# Patient Record
Sex: FEMALE | Race: WHITE
[De-identification: names, ages, dates, MRNs, and addresses within clinical notes are randomized per-mention and may not be internally consistent; named-entity substitution may affect disease eponyms.]

---

## 2017-01-22 ENCOUNTER — HOSPITAL ENCOUNTER (EMERGENCY)
Dept: HOSPITAL 45 - C.EDB | Age: 22
Discharge: HOME | End: 2017-01-22
Payer: COMMERCIAL

## 2017-01-22 VITALS
HEIGHT: 62.99 IN | HEIGHT: 62.99 IN | WEIGHT: 131.4 LBS | BODY MASS INDEX: 23.28 KG/M2 | WEIGHT: 131.4 LBS | BODY MASS INDEX: 23.28 KG/M2

## 2017-01-22 VITALS — DIASTOLIC BLOOD PRESSURE: 92 MMHG | SYSTOLIC BLOOD PRESSURE: 130 MMHG | HEART RATE: 79 BPM | OXYGEN SATURATION: 98 %

## 2017-01-22 VITALS — TEMPERATURE: 98.42 F

## 2017-01-22 DIAGNOSIS — Z83.3: ICD-10-CM

## 2017-01-22 DIAGNOSIS — Z84.1: ICD-10-CM

## 2017-01-22 DIAGNOSIS — R07.89: Primary | ICD-10-CM

## 2017-01-22 DIAGNOSIS — Z82.49: ICD-10-CM

## 2017-01-22 LAB
ANION GAP SERPL CALC-SCNC: 11 MMOL/L (ref 3–11)
BASOPHILS # BLD: 0.02 K/UL (ref 0–0.2)
BASOPHILS NFR BLD: 0.2 %
BUN SERPL-MCNC: 7 MG/DL (ref 7–18)
BUN/CREAT SERPL: 8.8 (ref 10–20)
CALCIUM SERPL-MCNC: 9.5 MG/DL (ref 8.5–10.1)
CHLORIDE SERPL-SCNC: 102 MMOL/L (ref 98–107)
CO2 SERPL-SCNC: 27 MMOL/L (ref 21–32)
COMPLETE: YES
CREAT CL PREDICTED SERPL C-G-VRATE: 99.8 ML/MIN
CREAT SERPL-MCNC: 0.75 MG/DL (ref 0.6–1.2)
EOSINOPHIL NFR BLD AUTO: 371 K/UL (ref 130–400)
GLUCOSE SERPL-MCNC: 89 MG/DL (ref 70–99)
HCT VFR BLD CALC: 39.3 % (ref 37–47)
IG%: 0.2 %
IMM GRANULOCYTES NFR BLD AUTO: 38.1 %
LYMPHOCYTES # BLD: 3.3 K/UL (ref 1.2–3.4)
MCH RBC QN AUTO: 31.2 PG (ref 25–34)
MCHC RBC AUTO-ENTMCNC: 35.4 G/DL (ref 32–36)
MCV RBC AUTO: 88.3 FL (ref 80–100)
MONOCYTES NFR BLD: 11 %
NEUTROPHILS # BLD AUTO: 0.5 %
NEUTROPHILS NFR BLD AUTO: 50 %
PMV BLD AUTO: 8.9 FL (ref 7.4–10.4)
POTASSIUM SERPL-SCNC: 3.5 MMOL/L (ref 3.5–5.1)
RBC # BLD AUTO: 4.45 M/UL (ref 4.2–5.4)
SODIUM SERPL-SCNC: 140 MMOL/L (ref 136–145)
WBC # BLD AUTO: 8.67 K/UL (ref 4.8–10.8)

## 2017-01-22 NOTE — EMERGENCY ROOM VISIT NOTE
History


Report prepared by Radha:  Sean Landin


Under the Supervision of:  Dr. Mushtaq Payan D.O.


First contact with patient:  18:40


Chief Complaint:  RIB PAIN


Stated Complaint:  PAIN IN RT RIBS & BACK





History of Present Illness


The patient is a 21 year old female who presents to the Emergency Room with 

complaints of persistent right rib pain that worsened after she sneezed 45 

minutes ago. The patient notes that she has had a lingering pain for the past 

four days, but she presented to ED this evening after she sneezed because the 

pain became excruciating and she thought she felt something move or snap. She 

describes the discomfort as stabbing. Holding pressure relieves the pain 

somewhat. Breathing worsens the discomfort. Pt denies headache, change in vision

, fevers, nausea, vomiting, diarrhea, pain with urination, melena, runny nose, 

sore throat, swelling of calves, abdominal pain, and rash. She also denies 

history of blood clots, recent travel, history of cancer, hemoptysis, recent 

surgeries, previous blood clots, falls or trauma.  She denies any hypertension, 

hyperlipidemia, CAD or MI.





   Source of History:  patient


   Onset:  4 days ago


   Position:  other (right ribs)


   Symptom Intensity:  excruciating


   Quality:  sharp, stabbing


   Timing:  other (persistent)


   Modifying Factors (Worsening):  breathing


   Modifying Factors (Relieving):  other (holding pressure)


   Associated Symptoms:  No abdominal pain, No diarrhea, No fevers, No headache

, No melena, No nausea, No rash, No vomiting


Note:


Denies: change in vision, runny nose, sore throat, swelling of calves





Review of Systems


See HPI for pertinent positives & negatives. A total of 10 systems reviewed and 

were otherwise negative.





Past Medical & Surgical


Medical Problems:


(1) Bronchitis


(2) Contusion of rib


(3) Contusion, arm, upper


(4) No significant medical problems


(5) Pneumonia


Surgical Problems:


(1) No significant past surgical history








Family History





Cancer


Diabetes mellitus


FHx: gallbladder disease


Heart disease


Hypertension


Kidney disease


Kidney stones


Seizures





Social History


Smoking Status:  Never Smoker


Alcohol Use:  none


Drug Use:  none


Marital Status:  single


Housing Status:  lives with family


Occupation Status:  employed, student





Current/Historical Medications


Scheduled


Birth Control Pills (Birth Control Pills), 1 TAB PO DAILY


Calcium Carbonate (Tums), 3 TABS PO AS DIRECTED


Ibuprofen (Advil), 400-600 MG PO Q6H





Allergies


Coded Allergies:  


     No Known Allergies (Unverified , 1/22/17)





Physical Exam


Vital Signs











  Date Time  Temp Pulse Resp B/P Pulse Ox O2 Delivery O2 Flow Rate FiO2


 


1/22/17 21:00  79 18 130/92 98   


 


1/22/17 20:01  95 18 124/84 98 Room Air  


 


1/22/17 18:17 36.9 110 20 153/94 97 Room Air  











Physical Exam


GENERAL: sitting up in bed, holding her right chest wall


EYE EXAM: normal conjunctiva,


OROPHARYNX: no exudate, no erythema, lips, buccal mucosa, and tongue normal and 

mucous membranes are moist


NECK: supple, no nuchal rigidity, no adenopathy, non-tender


LUNGS: Clear to auscultation. Normal chest wall mechanics


HEART: no murmurs, S1 normal and S2 normal.. tachycardic


CHEST: acute reproducible tenderness under right breast, no erythema or bruising

, skin intact. 


ABDOMEN: abdomen soft, non-tender, normo-active bowel sounds, no masses, no 

rebound or guarding. 


BACK: Back is symmetrical on inspection and there is no deformity, no midline 

tenderness, no CVA tenderness. 


SKIN: no rashes and no bruising 


UPPER EXTREMITIES: upper extremities are grossly normal. 


LOWER EXTREMITIES: No pitting edema.


NEURO EXAM: Normal sensorium, cranial nerves II-XII grossly intact, normal 

speech,  no gross weakness of arms, no gross weakness of legs. Gross sensation 

intact.





Medical Decision & Procedures


ER Provider


Diagnostic Interpretation:


Xray results per the radiologist and my interpretation. Other results have been 

interpreted by the radiologist and reviewed by me.





RIGHT RIBS UNILATERAL WITH PA CHEST





CLINICAL HISTORY: Right rib pain. Coughing.    





COMPARISON STUDY:  No previous studies for comparison.





FINDINGS: The erect chest reveals no pneumothorax. There is no focal pulmonary


consolidation. Multiple views of the right ribs reveal no acute fractures.





IMPRESSION:  No evidence of pneumothorax. No right-sided rib fractures are


visualized. 














Electronically signed by:  Jose Luong M.D.


1/22/2017 7:34 PM





Dictated Date/Time:  1/22/2017 7:33 PM





Laboratory Results


1/22/17 18:55








Red Blood Count 4.45, Mean Corpuscular Volume 88.3, Mean Corpuscular Hemoglobin 

31.2, Mean Corpuscular Hemoglobin Concent 35.4, Mean Platelet Volume 8.9, 

Neutrophils (%) (Auto) 50.0, Lymphocytes (%) (Auto) 38.1, Monocytes (%) (Auto) 

11.0, Eosinophils (%) (Auto) 0.5, Basophils (%) (Auto) 0.2, Neutrophils # (Auto

) 4.34, Lymphocytes # (Auto) 3.30, Monocytes # (Auto) 0.95, Eosinophils # (Auto

) 0.04, Basophils # (Auto) 0.02





1/22/17 18:55

















Test


  1/22/17


18:55


 


White Blood Count


  8.67 K/uL


(4.8-10.8)


 


Red Blood Count


  4.45 M/uL


(4.2-5.4)


 


Hemoglobin


  13.9 g/dL


(12.0-16.0)


 


Hematocrit 39.3 % (37-47) 


 


Mean Corpuscular Volume


  88.3 fL


()


 


Mean Corpuscular Hemoglobin


  31.2 pg


(25-34)


 


Mean Corpuscular Hemoglobin


Concent 35.4 g/dl


(32-36)


 


Platelet Count


  371 K/uL


(130-400)


 


Mean Platelet Volume


  8.9 fL


(7.4-10.4)


 


Neutrophils (%) (Auto) 50.0 % 


 


Lymphocytes (%) (Auto) 38.1 % 


 


Monocytes (%) (Auto) 11.0 % 


 


Eosinophils (%) (Auto) 0.5 % 


 


Basophils (%) (Auto) 0.2 % 


 


Neutrophils # (Auto)


  4.34 K/uL


(1.4-6.5)


 


Lymphocytes # (Auto)


  3.30 K/uL


(1.2-3.4)


 


Monocytes # (Auto)


  0.95 K/uL


(0.11-0.59)


 


Eosinophils # (Auto)


  0.04 K/uL


(0-0.5)


 


Basophils # (Auto)


  0.02 K/uL


(0-0.2)


 


RDW Standard Deviation


  41.9 fL


(36.4-46.3)


 


RDW Coefficient of Variation


  12.9 %


(11.5-14.5)


 


Immature Granulocyte % (Auto) 0.2 % 


 


Immature Granulocyte # (Auto)


  0.02 K/uL


(0.00-0.02)


 


D-Dimer


  330 ug/L FEU


(0-500)


 


Anion Gap


  11.0 mmol/L


(3-11)


 


Est Creatinine Clear Calc


Drug Dose 99.8 ml/min 


 


 


Estimated GFR (


American) 133.9 


 


 


Estimated GFR (Non-


American 115.6 


 


 


BUN/Creatinine Ratio 8.8 (10-20) 


 


Calcium Level


  9.5 mg/dl


(8.5-10.1)





Laboratory results per my review.





Medications Administered











 Medications


  (Trade)  Dose


 Ordered  Sig/Nely


 Route  Start Time


 Stop Time Status Last Admin


Dose Admin


 


 Sodium Chloride


  (Nss 500ml)  500 ml @ 


 999 mls/hr  Q31M STAT


 IV  1/22/17 18:47


 1/22/17 19:17 DC 1/22/17 19:00


999 MLS/HR


 


 Ketorolac


 Tromethamine


  (Toradol Inj)  30 mg  NOW  STAT


 IV  1/22/17 18:47


 1/22/17 18:49 DC 1/22/17 19:00


30 MG


 


 Morphine Sulfate


  (MoRPHine


 SULFATE INJ)  4 mg  NOW  STAT


 IV  1/22/17 18:47


 1/22/17 18:49 DC 1/22/17 18:59


4 MG











ED Course


ED COURSE: 


Vital signs were reviewed and showed tachycardic.


The patients medical record was reviewed


The above diagnostic studies were performed and reviewed.


ED treatments and interventions as stated above. 





1837: The patient was evaluated in room C9. A complete history and physical 

examination was performed.





1847: Ordered Morphine Sulfate 4 mg IV, Toradol 30 mg IV,  ml @ 999 mls/

hr IV.





1942: Upon reevaluation, the patient is resting.I discussed my findings with 

the patient and she understands and agrees with the treatment plan.   


Based on the patients age, coexisting illnesses, exam and lab findings the 

decision to treat as an outpatient was made.


The patient remained stable while under my care.


The patient appeared well at the time of discharge.





Medical Decision


Differential diagnoses includes but is not limited to acute coronary syndrome, 

myocardial infarction, pericarditis, pulmonary embolus, aortic dissection, 

pneumonia, pneumothorax, musculoskeletal, shingles, esophageal. 





Patient is a 21-year-old female who presents the ER severe right-sided chest 

pain which is clearly reproducible on exam.  She has point tenderness under her 

right breast.  No signs of trauma or erythema.  Patient is given IV Toradol 

morphine.  Chest x-ray shows no rib fractures.  CBC along with BMP was 

unremarkable.  D-dimer was negative.  She is a low risk for PEs.  She is no 

cardiac risk factors.  This is clearly reproducible and consequently I do not 

believe it to be cardiac.  With her negative d-dimer I did not pursue this any 

further.  Pain is improved with splinting and consequently I question whether 

this is a hairline rib fracture/contusion/muscle sprain. Discussed with Pt 

concerning signs and symptoms to watch out for. Pt was instructed to follow up 

with their PCP and discussed with the patient their option to return to the ED 

at anytime for persistent or worsening symptoms. The appropriate anticipatory 

guidance and out-patient management, including indications for return to the 

emergency department, were explained at length to the patient and understood.





Impression





 Primary Impression:  


 Right-sided chest wall pain





Scribe Attestation


The scribe's documentation has been prepared under my direction and personally 

reviewed by me in its entirety. I confirm that the note above accurately 

reflects all work, treatment, procedures, and medical decision making performed 

by me.





Departure Information


Dispostion


Home / Self-Care





Referrals


No Doctor, Assigned (PCP)





Forms


HOME CARE DOCUMENTATION FORM,                                                 

               IMPORTANT VISIT INFORMATION, WORK / SCHOOL INSTRUCTIONS





Patient Instructions


My [facility]





Additional Instructions





Please follow up with your primary care doctor with in the next 24 hours.  Any 

worsening of your symptoms, please return to the ED immediately.  This includes 

any fevers greater than 100.4, worsening pain, passing out, feel blood, or any 

other concerning signs or symptoms from your standpoint.





Please take Tylenol or Motrin as needed for pain.

## 2017-01-22 NOTE — DIAGNOSTIC IMAGING REPORT
RIGHT RIBS UNILATERAL WITH PA CHEST



CLINICAL HISTORY: Right rib pain. Coughing.    



COMPARISON STUDY:  No previous studies for comparison.



FINDINGS: The erect chest reveals no pneumothorax. There is no focal pulmonary

consolidation. Multiple views of the right ribs reveal no acute fractures.



IMPRESSION:  No evidence of pneumothorax. No right-sided rib fractures are

visualized. 









Electronically signed by:  Jose Luong M.D.

1/22/2017 7:34 PM



Dictated Date/Time:  1/22/2017 7:33 PM

## 2017-11-26 ENCOUNTER — HOSPITAL ENCOUNTER (EMERGENCY)
Dept: HOSPITAL 45 - C.EDB | Age: 22
Discharge: HOME | End: 2017-11-26
Payer: COMMERCIAL

## 2017-11-26 VITALS
HEIGHT: 62.01 IN | BODY MASS INDEX: 24.96 KG/M2 | WEIGHT: 137.35 LBS | BODY MASS INDEX: 24.96 KG/M2 | WEIGHT: 137.35 LBS | HEIGHT: 62.01 IN

## 2017-11-26 VITALS — HEART RATE: 78 BPM | DIASTOLIC BLOOD PRESSURE: 78 MMHG | OXYGEN SATURATION: 98 % | SYSTOLIC BLOOD PRESSURE: 111 MMHG

## 2017-11-26 VITALS — TEMPERATURE: 98.42 F

## 2017-11-26 DIAGNOSIS — Z33.1: Primary | ICD-10-CM

## 2017-11-26 DIAGNOSIS — Z84.1: ICD-10-CM

## 2017-11-26 DIAGNOSIS — R10.2: ICD-10-CM

## 2017-11-26 DIAGNOSIS — N83.201: ICD-10-CM

## 2017-11-26 DIAGNOSIS — Z82.0: ICD-10-CM

## 2017-11-26 DIAGNOSIS — Z82.49: ICD-10-CM

## 2017-11-26 DIAGNOSIS — Z83.3: ICD-10-CM

## 2017-11-26 LAB
ALP SERPL-CCNC: 76 U/L (ref 45–117)
ALT SERPL-CCNC: 25 U/L (ref 12–78)
ANION GAP SERPL CALC-SCNC: 10 MMOL/L (ref 3–11)
APPEARANCE UR: CLEAR
AST SERPL-CCNC: 17 U/L (ref 15–37)
BASOPHILS # BLD: 0.03 K/UL (ref 0–0.2)
BASOPHILS NFR BLD: 0.2 %
BILIRUB UR-MCNC: (no result) MG/DL
BUN SERPL-MCNC: 8 MG/DL (ref 7–18)
BUN/CREAT SERPL: 11.3 (ref 10–20)
CALCIUM SERPL-MCNC: 9.2 MG/DL (ref 8.5–10.1)
CHLORIDE SERPL-SCNC: 103 MMOL/L (ref 98–107)
CO2 SERPL-SCNC: 25 MMOL/L (ref 21–32)
COLOR UR: YELLOW
COMPLETE: YES
CREAT CL PREDICTED SERPL C-G-VRATE: 109.7 ML/MIN
CREAT SERPL-MCNC: 0.71 MG/DL (ref 0.6–1.2)
EOSINOPHIL NFR BLD AUTO: 362 K/UL (ref 130–400)
GLUCOSE SERPL-MCNC: 95 MG/DL (ref 70–99)
HCT VFR BLD CALC: 41.5 % (ref 37–47)
IG%: 0.3 %
IMM GRANULOCYTES NFR BLD AUTO: 23.3 %
LYMPHOCYTES # BLD: 3.03 K/UL (ref 1.2–3.4)
MANUAL MICROSCOPIC REQUIRED?: NO
MCH RBC QN AUTO: 30.4 PG (ref 25–34)
MCHC RBC AUTO-ENTMCNC: 34.2 G/DL (ref 32–36)
MCV RBC AUTO: 88.9 FL (ref 80–100)
MONOCYTES NFR BLD: 8.1 %
NEUTROPHILS # BLD AUTO: 0.5 %
NEUTROPHILS NFR BLD AUTO: 67.6 %
NITRITE UR QL STRIP: (no result)
PH UR STRIP: 6.5 [PH] (ref 4.5–7.5)
PMV BLD AUTO: 9.1 FL (ref 7.4–10.4)
POTASSIUM SERPL-SCNC: 3.2 MMOL/L (ref 3.5–5.1)
RBC # BLD AUTO: 4.67 M/UL (ref 4.2–5.4)
REVIEW REQ?: NO
SODIUM SERPL-SCNC: 138 MMOL/L (ref 136–145)
SP GR UR STRIP: 1.01 (ref 1–1.03)
URINE BILL WITH OR WITHOUT MIC: (no result)
UROBILINOGEN UR-MCNC: (no result) MG/DL
WBC # BLD AUTO: 13.03 K/UL (ref 4.8–10.8)
ZZUR CULT IF INDIC CLEAN CATCH: NO

## 2017-11-26 NOTE — EMERGENCY ROOM VISIT NOTE
History


Report prepared by Radha:  Elvira Jones


Under the Supervision of:  Dr. Garret Lucero M.D.


First contact with patient:  17:34


Chief Complaint:  PELVIC  PAIN


Stated Complaint:  PELVIC PAIN, BACK/NECK PAIN





History of Present Illness


The patient is a 22 year old female who presents to the Emergency Room with 

complaints of intermittent pelvic pain beginning this morning. The patient 

notes lower back pain, neck pain, and hot and cold flashes. The patient states 

that she took a pregnancy test 2 days ago. The patient notes some clear vaginal 

discharge which is not normal for her. She denies any fever, or urinary 

burning. The patient states she is a little sick right now with a cough and 

congestion. She states that if she did not have a positive pregnancy test she 

probably wouldn't of come into the ED. The patient is not on birth control. The 

patient's last period was on October 25th and she states she is six days late 

for her period. The patient has a history of ovarian cysts.





   Source of History:  patient


   Onset:  this morning


   Position:  pelvis


   Timing:  intermittent


   Associated Symptoms:  + cough, + neck pain, + back pain, No fevers, No 

urinary symptoms


Note:


The patient notes clear vaginal discharge.





Review of Systems


See HPI for pertinent positives and negatives.  A total of ten systems were 

reviewed and were otherwise negative.





Past Medical & Surgical


Medical Problems:


(1) Bronchitis


(2) Contusion of rib


(3) Contusion, arm, upper


(4) No significant medical problems


(5) Pneumonia


Surgical Problems:


(1) No significant past surgical history








Family History





Cancer


Diabetes mellitus


FHx: gallbladder disease


Heart disease


Hypertension


Kidney disease


Kidney stones


Seizures





Social History


Smoking Status:  Never Smoker


Alcohol Use:  none


Drug Use:  none


Marital Status:  single


Housing Status:  lives with family


Occupation Status:  employed, student





Current/Historical Medications


No Active Prescriptions or Reported Meds





Allergies


Coded Allergies:  


     No Known Allergies (Unverified , 1/22/17)





Physical Exam


Vital Signs











  Date Time  Temp Pulse Resp B/P (MAP) Pulse Ox O2 Delivery O2 Flow Rate FiO2


 


11/26/17 21:53  78 20 111/78 98   


 


11/26/17 20:12  101 18 113/63 98 Room Air  


 


11/26/17 17:28 36.9 108 20 122/86 97 Room Air  











Physical Exam


GENERAL: Awake, alert, well-appearing, in no distress


HENT: Normocephalic, atraumatic. Oropharynx unremarkable.


EYES: Normal conjunctiva. Sclera non-icteric.


NECK: Supple. No nuchal rigidity. FROM. No JVD.


RESPIRATORY: Clear to auscultation.


CARDIAC: Regular rate, normal rhythm. Extremities warm and well perfused. 

Pulses equal.


ABDOMEN: Mild left lower quadrant tenderness, no peritoneal signs. Soft, non-

distended. No rebound or guarding. No masses.


RECTAL: Deferred.


MUSCULOSKELETAL: Chest examination reveals no tenderness. The back is 

symmetrical on inspection without obvious abnormality. There is no CVA 

tenderness to palpation. No joint edema. 


LOWER EXTREMITIES: Calves are equal size bilaterally and non-tender. No edema. 

No discoloration. 


NEURO: Normal sensorium. No sensory or motor deficits noted. 


SKIN: No rash or jaundice noted.





Medical Decision & Procedures


ER Provider


Diagnostic Interpretation:


Radiology results as stated below per my review and radiologist interpretation: 





FETAL ultrasound <14 WKS SINGLE


COMPARISON STUDY:  Abdomen and pelvis CT 9/12/2016. Pelvic ultrasound 9/11/2016.





FINDINGS: Transabdominal and transvaginal scanning of the pelvis was performed.


The uterus measures 8.6 x 5.8 x 4.5 cm. The endometrium slightly heterogeneous


and measures up to 1.2 cm in thickness. Within the center of the endometrial


fundus there is a 9 x 3 mm cystic focus. This favors a small gestational sac.


There may be a tiny yolk sac which measures 2 mm. No fetal pole identified at


this time. These findings favor a 5 week intrauterine gestation. No significant


subchorionic hematoma. Trace pelvic fluid. Thick-walled complex cyst within the


right ovary which measures 2.5 cm. This favors a corpus luteum. Normal left


ovary. Normal color flow within the bilateral ovaries.





IMPRESSION:  


1. A 9 x 3 mm cystic focus within the endometrium which favors a small


intrauterine gestational sac. There may be a tiny yolk sac which measures 2 mm.


No fetal pole identified at this time. This favors a 5 week intrauterine


gestation. Follow-up beta-hCG and pelvic ultrasound in 1 week is recommended to


ensure viability.


2. Complex 2.5 cm right ovarian cyst. This favors a corpus luteum.


3. Normal left ovary.








Electronically signed by:  Shahzad Spencer M.D.





Laboratory Results


11/26/17 17:48








Red Blood Count 4.67, Mean Corpuscular Volume 88.9, Mean Corpuscular Hemoglobin 

30.4, Mean Corpuscular Hemoglobin Concent 34.2, Mean Platelet Volume 9.1, 

Neutrophils (%) (Auto) 67.6, Lymphocytes (%) (Auto) 23.3, Monocytes (%) (Auto) 

8.1, Eosinophils (%) (Auto) 0.5, Basophils (%) (Auto) 0.2, Neutrophils # (Auto) 

8.81, Lymphocytes # (Auto) 3.03, Monocytes # (Auto) 1.06, Eosinophils # (Auto) 

0.06, Basophils # (Auto) 0.03





11/26/17 17:48

















Test


  11/26/17


17:48 11/26/17


17:50


 


White Blood Count


  13.03 K/uL


(4.8-10.8) 


 


 


Red Blood Count


  4.67 M/uL


(4.2-5.4) 


 


 


Hemoglobin


  14.2 g/dL


(12.0-16.0) 


 


 


Hematocrit 41.5 % (37-47)  


 


Mean Corpuscular Volume


  88.9 fL


() 


 


 


Mean Corpuscular Hemoglobin


  30.4 pg


(25-34) 


 


 


Mean Corpuscular Hemoglobin


Concent 34.2 g/dl


(32-36) 


 


 


Platelet Count


  362 K/uL


(130-400) 


 


 


Mean Platelet Volume


  9.1 fL


(7.4-10.4) 


 


 


Neutrophils (%) (Auto) 67.6 %  


 


Lymphocytes (%) (Auto) 23.3 %  


 


Monocytes (%) (Auto) 8.1 %  


 


Eosinophils (%) (Auto) 0.5 %  


 


Basophils (%) (Auto) 0.2 %  


 


Neutrophils # (Auto)


  8.81 K/uL


(1.4-6.5) 


 


 


Lymphocytes # (Auto)


  3.03 K/uL


(1.2-3.4) 


 


 


Monocytes # (Auto)


  1.06 K/uL


(0.11-0.59) 


 


 


Eosinophils # (Auto)


  0.06 K/uL


(0-0.5) 


 


 


Basophils # (Auto)


  0.03 K/uL


(0-0.2) 


 


 


RDW Standard Deviation


  41.7 fL


(36.4-46.3) 


 


 


RDW Coefficient of Variation


  12.9 %


(11.5-14.5) 


 


 


Immature Granulocyte % (Auto) 0.3 %  


 


Immature Granulocyte # (Auto)


  0.04 K/uL


(0.00-0.02) 


 


 


Anion Gap


  10.0 mmol/L


(3-11) 


 


 


Est Creatinine Clear Calc


Drug Dose 109.7 ml/min 


  


 


 


Estimated GFR (


American) 142.1 


  


 


 


Estimated GFR (Non-


American 122.6 


  


 


 


BUN/Creatinine Ratio 11.3 (10-20)  


 


Calcium Level


  9.2 mg/dl


(8.5-10.1) 


 


 


Total Bilirubin


  0.4 mg/dl


(0.2-1) 


 


 


Direct Bilirubin


  < 0.1 mg/dl


(0-0.2) 


 


 


Aspartate Amino Transf


(AST/SGOT) 17 U/L (15-37) 


  


 


 


Alanine Aminotransferase


(ALT/SGPT) 25 U/L (12-78) 


  


 


 


Alkaline Phosphatase


  76 U/L


() 


 


 


Total Protein


  7.8 gm/dl


(6.4-8.2) 


 


 


Albumin


  3.6 gm/dl


(3.4-5.0) 


 


 


Lipase


  195 U/L


() 


 


 


Human Chorionic Gonadotropin,


Quant 3555 mIU/mL 


  


 


 


Urine Color  YELLOW 


 


Urine Appearance  CLEAR (CLEAR) 


 


Urine pH  6.5 (4.5-7.5) 


 


Urine Specific Gravity


  


  1.010


(1.000-1.030)


 


Urine Protein  NEG (NEG) 


 


Urine Glucose (UA)  NEG (NEG) 


 


Urine Ketones  NEG (NEG) 


 


Urine Occult Blood  NEG (NEG) 


 


Urine Nitrite  NEG (NEG) 


 


Urine Bilirubin  NEG (NEG) 


 


Urine Urobilinogen  NEG (NEG) 


 


Urine Leukocyte Esterase  NEG (NEG) 





Laboratory results reviewed by me





Medications Administered











 Medications


  (Trade)  Dose


 Ordered  Sig/Nely


 Route  Start Time


 Stop Time Status Last Admin


Dose Admin


 


 Lactated Ringer's  1,000 ml @ 


 999 mls/hr  Q1H1M STAT


 IV  11/26/17 17:44


 11/26/17 18:44 DC 11/26/17 18:27


999 MLS/HR


 


 Acetaminophen


  (Tylenol Tab)  650 mg  NOW  STAT


 PO  11/26/17 17:44


 11/26/17 17:45 DC 11/26/17 18:27


650 MG











ED Course


1735: The patient was evaluated in room B11B. A complete history and physical 

exam was performed.





2208: I updated the patient on her test results she will follow up with OBGYN.





2236: I reevaluated the patient. Discussed results and discharge instructions: 

She verbalized understanding and agreement. The patient is ready for discharge.





Medical Decision


I reviewed the patient's past medical history, medications, and the nursing 

notes as described above. Differential Diagnoses include: Pregnancy, ectopic 

pregnancy, ovarian torsion, UTI, pyelonephritis, ureteral stone, hemorrhagic 

cyst.





The patient is a 22-year-old woman who presents to emergency Department with 

lower abdominal pain and back aches for the past several days in the setting of 

a positive home pregnancy test after being 6 days late for her menstrual cycle 

per history of present illness.  The patient is well-appearing, afebrile with 

stable vital signs.  Mild left lower quadrant discomfort but otherwise no 

peritoneal signs. WBC 13 nonspecific, hcg 3500. Labs otherwise unremarkable 

including negative UA. TVUS demonstrates likely early IUP. Patient prefers to 

defer pelvic exam for when she follows up with her OB/GYN. Findings and plan 

for follow-up reviewed with patient. Patient agreeable and d/c'd per discharge 

instructions.





Blood Pressure Screening


Patient's blood pressure:  Normal blood pressure





Impression





 Primary Impression:  


 Intrauterine pregnancy





Scribe Attestation


The scribe's documentation has been prepared under my direction and personally 

reviewed by me in its entirety. I confirm that the note above accurately 

reflects all work, treatment, procedures, and medical decision making performed 

by me.





Departure Information


Dispostion


Home / Self-Care





Prescriptions





No Active Prescriptions or Reported Meds





Referrals


No Doctor, Assigned (PCP)





Patient Instructions


ED Preg Established Normal Sxs, My [facility]





Additional Instructions





Please follow up with your OB/GYN within the next week for re-evaluation.





You were found to have a likely intrauterine pregnancy.


Otherwise, your exam, ultrasound, and lab results did not show signs of an 

emergent condition at this time.





Return to the emergency department for worsening symptoms as described in the 

accompanying instructions.








----------------------





FETAL ultrasound <14 WKS SINGLE





CLINICAL HISTORY: +home preg test, LLQ abd pain   





COMPARISON STUDY:  Abdomen and pelvis CT 9/12/2016. Pelvic ultrasound 9/11/2016.





FINDINGS: Transabdominal and transvaginal scanning of the pelvis was performed.


The uterus measures 8.6 x 5.8 x 4.5 cm. The endometrium slightly heterogeneous


and measures up to 1.2 cm in thickness. Within the center of the endometrial


fundus there is a 9 x 3 mm cystic focus. This favors a small gestational sac.


There may be a tiny yolk sac which measures 2 mm. No fetal pole identified at


this time. These findings favor a 5 week intrauterine gestation. No significant


subchorionic hematoma. Trace pelvic fluid. Thick-walled complex cyst within the


right ovary which measures 2.5 cm. This favors a corpus luteum. Normal left


ovary. Normal color flow within the bilateral ovaries.





IMPRESSION:  


1. A 9 x 3 mm cystic focus within the endometrium which favors a small


intrauterine gestational sac. There may be a tiny yolk sac which measures 2 mm.


No fetal pole identified at this time. This favors a 5 week intrauterine


gestation. Follow-up beta-hCG and pelvic ultrasound in 1 week is recommended to


ensure viability.


2. Complex 2.5 cm right ovarian cyst. This favors a corpus luteum.


3. Normal left ovary.

## 2017-12-22 ENCOUNTER — HOSPITAL ENCOUNTER (OUTPATIENT)
Dept: HOSPITAL 45 - C.LAB1850 | Age: 22
Discharge: HOME | End: 2017-12-22
Attending: OBSTETRICS & GYNECOLOGY
Payer: COMMERCIAL

## 2017-12-22 DIAGNOSIS — Z3A.00: ICD-10-CM

## 2017-12-22 DIAGNOSIS — Z34.01: Primary | ICD-10-CM

## 2017-12-22 LAB
APPEARANCE UR: (no result)
BASOPHILS # BLD: 0.02 K/UL (ref 0–0.2)
BASOPHILS NFR BLD: 0.2 %
BILIRUB UR-MCNC: (no result) MG/DL
COLOR UR: YELLOW
COMPLETE: YES
EOSINOPHIL NFR BLD AUTO: 349 K/UL (ref 130–400)
HCT VFR BLD CALC: 37.4 % (ref 37–47)
IG%: 0.3 %
IMM GRANULOCYTES NFR BLD AUTO: 22.7 %
LYMPHOCYTES # BLD: 2.79 K/UL (ref 1.2–3.4)
MANUAL MICROSCOPIC REQUIRED?: NO
MCH RBC QN AUTO: 30.6 PG (ref 25–34)
MCHC RBC AUTO-ENTMCNC: 34.5 G/DL (ref 32–36)
MCV RBC AUTO: 88.8 FL (ref 80–100)
MONOCYTES NFR BLD: 7.6 %
NEUTROPHILS # BLD AUTO: 0.3 %
NEUTROPHILS NFR BLD AUTO: 68.9 %
NITRITE UR QL STRIP: (no result)
PH UR STRIP: 8.5 [PH] (ref 4.5–7.5)
PMV BLD AUTO: 9.4 FL (ref 7.4–10.4)
RBC # BLD AUTO: 4.21 M/UL (ref 4.2–5.4)
REVIEW REQ?: NO
SP GR UR STRIP: 1.02 (ref 1–1.03)
URINE BILL WITH OR WITHOUT MIC: (no result)
URINE EPITHELIAL CELL AUTO: (no result) /LPF (ref 0–5)
UROBILINOGEN UR-MCNC: (no result) MG/DL
WBC # BLD AUTO: 12.31 K/UL (ref 4.8–10.8)

## 2017-12-26 LAB
CHLAMYDIA TRACH RNA***: NOT DETECTED
GC (NEIS GONORRHOEAE)RNA**: NOT DETECTED

## 2018-02-12 ENCOUNTER — HOSPITAL ENCOUNTER (OUTPATIENT)
Dept: HOSPITAL 45 - C.LAB1850 | Age: 23
Discharge: HOME | End: 2018-02-12
Attending: OBSTETRICS & GYNECOLOGY
Payer: COMMERCIAL

## 2018-02-12 DIAGNOSIS — Z34.02: Primary | ICD-10-CM

## 2018-03-23 ENCOUNTER — HOSPITAL ENCOUNTER (EMERGENCY)
Dept: HOSPITAL 45 - C.EDB | Age: 23
Discharge: HOME | End: 2018-03-23
Payer: COMMERCIAL

## 2018-03-23 VITALS
HEIGHT: 62.01 IN | WEIGHT: 154.32 LBS | BODY MASS INDEX: 28.04 KG/M2 | BODY MASS INDEX: 28.04 KG/M2 | HEIGHT: 62.01 IN | WEIGHT: 154.32 LBS

## 2018-03-23 VITALS — DIASTOLIC BLOOD PRESSURE: 78 MMHG | HEART RATE: 82 BPM | OXYGEN SATURATION: 100 % | SYSTOLIC BLOOD PRESSURE: 121 MMHG

## 2018-03-23 VITALS — TEMPERATURE: 98.06 F

## 2018-03-23 DIAGNOSIS — R79.1: ICD-10-CM

## 2018-03-23 DIAGNOSIS — E86.0: ICD-10-CM

## 2018-03-23 DIAGNOSIS — H53.8: ICD-10-CM

## 2018-03-23 DIAGNOSIS — R53.83: ICD-10-CM

## 2018-03-23 DIAGNOSIS — R55: Primary | ICD-10-CM

## 2018-03-23 LAB
ALBUMIN SERPL-MCNC: 2.7 GM/DL (ref 3.4–5)
ALP SERPL-CCNC: 76 U/L (ref 45–117)
ALT SERPL-CCNC: 16 U/L (ref 12–78)
AST SERPL-CCNC: 10 U/L (ref 15–37)
BASOPHILS # BLD: 0.01 K/UL (ref 0–0.2)
BASOPHILS NFR BLD: 0.1 %
BUN SERPL-MCNC: 7 MG/DL (ref 7–18)
CALCIUM SERPL-MCNC: 8.8 MG/DL (ref 8.5–10.1)
CO2 SERPL-SCNC: 23 MMOL/L (ref 21–32)
CREAT SERPL-MCNC: 0.58 MG/DL (ref 0.6–1.2)
EOS ABS #: 0.04 K/UL (ref 0–0.5)
EOSINOPHIL NFR BLD AUTO: 300 K/UL (ref 130–400)
GLUCOSE SERPL-MCNC: 87 MG/DL (ref 70–99)
HCT VFR BLD CALC: 34.4 % (ref 37–47)
HGB BLD-MCNC: 12 G/DL (ref 12–16)
IG#: 0.08 K/UL (ref 0–0.02)
IMM GRANULOCYTES NFR BLD AUTO: 15.8 %
LYMPHOCYTES # BLD: 2.22 K/UL (ref 1.2–3.4)
MCH RBC QN AUTO: 31.5 PG (ref 25–34)
MCHC RBC AUTO-ENTMCNC: 34.9 G/DL (ref 32–36)
MCV RBC AUTO: 90.3 FL (ref 80–100)
MONO ABS #: 1.39 K/UL (ref 0.11–0.59)
MONOCYTES NFR BLD: 9.9 %
NEUT ABS #: 10.3 K/UL (ref 1.4–6.5)
NEUTROPHILS # BLD AUTO: 0.3 %
NEUTROPHILS NFR BLD AUTO: 73.3 %
PMV BLD AUTO: 9.1 FL (ref 7.4–10.4)
POTASSIUM SERPL-SCNC: 3.2 MMOL/L (ref 3.5–5.1)
PROT SERPL-MCNC: 7.2 GM/DL (ref 6.4–8.2)
RED CELL DISTRIBUTION WIDTH CV: 14.4 % (ref 11.5–14.5)
RED CELL DISTRIBUTION WIDTH SD: 47.3 FL (ref 36.4–46.3)
SODIUM SERPL-SCNC: 136 MMOL/L (ref 136–145)
WBC # BLD AUTO: 14.04 K/UL (ref 4.8–10.8)

## 2018-03-23 NOTE — DIAGNOSTIC IMAGING REPORT
(CHEST FOR PE) ANGIO WITH



CLINICAL HISTORY: 22 years-old Female presenting with 

^CHEST PAIN--IF ABLE PLEASE EVALUATE THORACIC AORTA AS WELL. 



TECHNIQUE: Multidetector CT angiography of the chest was performed after

administration of intravenous contrast. 3-D volumetric and/or maximum intensity

projection (MIP) images were subsequently reconstructed for review. IV contrast:

Optiray 320. A dose lowering technique was used consistent with the principles

of ALARA (as low as reasonably achievable).



COMPARISON: Chest x-ray from 2015.



CT DOSE (mGy.cm): The estimated cumulative dose is 519.83 mGy.cm.



FINDINGS:



 topogram: Unremarkable.



Pulmonary vasculature:



The study is suboptimal for the assessment of the pulmonary vascular tree

secondary to timing of the contrast bolus and respiratory motion artifact.

Allowing for limited image quality, no central filling defect to suggest

pulmonary embolus. Main pulmonary artery is not enlarged. No flattening of the

interventricular septum. No intracardiac filling defect. No reflux of contrast

into the hepatic veins.



Remaining chest:



On soft tissue windows, normal thyroid and thoracic inlet. No axillary,

supraclavicular, hilar, or mediastinal lymphadenopathy. Normal aorta. Normal

heart size. No pericardial or pleural effusion. Upper abdomen normal.



On lung windows, minimal dependent changes likely atelectasis. No other focal

nodule or infiltrate. Airways patent.



On bone windows, normal osseous structures.



IMPRESSION:

1.  Allowing for suboptimal image quality, no evidence of pulmonary embolus. No

acute intrathoracic pathology.







Electronically signed by:  Bob Chacon M.D.

3/23/2018 7:40 PM



Dictated Date/Time:  3/23/2018 7:36 PM

## 2018-03-23 NOTE — EMERGENCY ROOM VISIT NOTE
History


Report prepared by Radha:  Vaibhav Woodward


Under the Supervision of:  Dr. James Son M.D.


First contact with patient:  17:25


Chief Complaint:  DIZZY


Stated Complaint:  DIZZINESS,SOB,BLURRED VISION,TINGLING IN HANDS


Nursing Triage Summary:  


pt to the ED with c/o feeling dizzy and near syncope today and like her heart 

is 


fluttering 





pt is 22 wks preg





no vag bleeding 





+ fetal movements





History of Present Illness


The patient is a 22 year old female who presents to the Emergency Room with 

complaints of dizziness and near syncope that began prior to arrival. The 

patient was in class when she felt these symptoms and noted that her vision 

became blurry. In the last week, she states that she has had intermittent right 

lower abdominal cramping pain and SOB for the past 1 week that worsens with 

movement. She reports vaginal discharge, urinary burning, and back pain. She 

reports nausea, vomiting (1 episode today), headache, and sick contacts at 

home. She states that she is 22 weeks pregnant and her recent ultrasound was 2 

weeks ago and showed flickering fetal movements. She denies ankle swelling, 

fevers, vaginal bleeding, and a history of blood clots. Of note, Dr. Brunner is 

her doctor who she follows up regularly.





   Source of History:  patient


   Onset:  PTA


   Position:  other (global)


   Symptom Intensity:  pain rated as 0/10


   Quality:  other (dizziness and near syncope)


   Timing:  intermittent


   Modifying Factors (Worsening):  movement


   Associated Symptoms:  + headache, + SOB (1 week), + nausea, + vomiting (1 

episode ), + abdominal pain (right lower quadrant cramping), + back pain, + 

urinary symptoms (urinary burning), No fevers


Note:


Patient reports sick contacts, and vaginal discharge. Patient denies ankle 

swelling and vaginal bleeding.





Review of Systems


See HPI for pertinent positives & negatives. A total of 10 systems reviewed and 

were otherwise negative.





Past Medical & Surgical


Medical Problems:


(1) Bronchitis


(2) Contusion of rib


(3) Contusion, arm, upper


(4) No significant medical problems


(5) Pneumonia


Surgical Problems:


(1) No significant past surgical history








Family History





Cancer


Diabetes mellitus


FHx: gallbladder disease


Heart disease


Hypertension


Kidney disease


Kidney stones


Seizures





Social History


Smoking Status:  Never Smoker


Alcohol Use:  none


Drug Use:  none


Marital Status:  single


Housing Status:  lives with family


Occupation Status:  employed, student





Current/Historical Medications


Scheduled


Oseltamivir (Tamiflu), 75 MG PO BID





Allergies


Coded Allergies:  


     No Known Allergies (Unverified , 1/22/17)





Physical Exam


Vital Signs











  Date Time  Temp Pulse Resp B/P (MAP) Pulse Ox O2 Delivery O2 Flow Rate FiO2


 


3/23/18 20:02  82 20 121/78 100   


 


3/23/18 19:01  83  124/75 100 Room Air  


 


3/23/18 18:50  72 20 140/85 98 Room Air  


 


3/23/18 17:36  98      


 


3/23/18 17:26  120  153/96    





  88  130/89    





  128  133/102    


 


3/23/18 16:49 36.7 110 18 145/95 98   











Physical Exam


GENERAL: Patient is anxious appearing and in mild distress.


EYES: No scleral icterus, unremarkable pupils.


ENT: Mildly dry mucous membranes, no nasal congestion.


NECK: No masses appreciated, no meningismus, trachea is midline.


RESPIRATORY: No dyspnea. Clear to auscultation and equal bilaterally. No wheeze

, no rhonchi.


CARDIOVASCULAR: Mildly tachycardiac. Regular rhythm.  No murmurs, rubs, gallops 

appreciated.


GASTROINTESTINAL: Large abdominal fundus consistent with pregnancy dates. 

Abdomen soft, nontender, no peritonitis.  Bowel sounds positive.  No masses 

appreciated.


BACK: No midline tenderness, no CVA tenderness


EXTREMITIES: Normal motion all extremities, no cyanosis, no edema.


NEUROLOGIC: Alert and oriented, no acute motor or sensory deficits, no focal 

weakness, cranial nerves grossly intact.


SKIN: No rash, no jaundice, no diaphoresis.





Medical Decision & Procedures


ER Provider


Diagnostic Interpretation:


Radiology results and stated below per my review and radiologist interpretation:





(CHEST FOR PE) ANGIO WITH





CLINICAL HISTORY: 22 years-old Female presenting with 


^CHEST PAIN--IF ABLE PLEASE EVALUATE THORACIC AORTA AS WELL. 





TECHNIQUE: Multidetector CT angiography of the chest was performed after


administration of intravenous contrast. 3-D volumetric and/or maximum intensity


projection (MIP) images were subsequently reconstructed for review. IV contrast:


Optiray 320. A dose lowering technique was used consistent with the principles


of ALARA (as low as reasonably achievable).





COMPARISON: Chest x-ray from 2015.





CT DOSE (mGy.cm): The estimated cumulative dose is 519.83 mGy.cm.





FINDINGS:





 topogram: Unremarkable.





Pulmonary vasculature:





The study is suboptimal for the assessment of the pulmonary vascular tree


secondary to timing of the contrast bolus and respiratory motion artifact.


Allowing for limited image quality, no central filling defect to suggest


pulmonary embolus. Main pulmonary artery is not enlarged. No flattening of the


interventricular septum. No intracardiac filling defect. No reflux of contrast


into the hepatic veins.





Remaining chest:





On soft tissue windows, normal thyroid and thoracic inlet. No axillary,


supraclavicular, hilar, or mediastinal lymphadenopathy. Normal aorta. Normal


heart size. No pericardial or pleural effusion. Upper abdomen normal.





On lung windows, minimal dependent changes likely atelectasis. No other focal


nodule or infiltrate. Airways patent.





On bone windows, normal osseous structures.





IMPRESSION:


1.  Allowing for suboptimal image quality, no evidence of pulmonary embolus. No


acute intrathoracic pathology.











Electronically signed by:  Bob Chacon M.D.


3/23/2018 7:40 PM





Dictated Date/Time:  3/23/2018 7:36 PM





Laboratory Results


3/23/18 17:45








Red Blood Count 3.81, Mean Corpuscular Volume 90.3, Mean Corpuscular Hemoglobin 

31.5, Mean Corpuscular Hemoglobin Concent 34.9, Mean Platelet Volume 9.1, 

Neutrophils (%) (Auto) 73.3, Lymphocytes (%) (Auto) 15.8, Monocytes (%) (Auto) 

9.9, Eosinophils (%) (Auto) 0.3, Basophils (%) (Auto) 0.1, Neutrophils # (Auto) 

10.30, Lymphocytes # (Auto) 2.22, Monocytes # (Auto) 1.39, Eosinophils # (Auto) 

0.04, Basophils # (Auto) 0.01





3/23/18 17:45

















Test


  3/23/18


17:45 3/23/18


18:35


 


White Blood Count


  14.04 K/uL


(4.8-10.8) 


 


 


Red Blood Count


  3.81 M/uL


(4.2-5.4) 


 


 


Hemoglobin


  12.0 g/dL


(12.0-16.0) 


 


 


Hematocrit 34.4 % (37-47)  


 


Mean Corpuscular Volume


  90.3 fL


() 


 


 


Mean Corpuscular Hemoglobin


  31.5 pg


(25-34) 


 


 


Mean Corpuscular Hemoglobin


Concent 34.9 g/dl


(32-36) 


 


 


Platelet Count


  300 K/uL


(130-400) 


 


 


Mean Platelet Volume


  9.1 fL


(7.4-10.4) 


 


 


Neutrophils (%) (Auto) 73.3 %  


 


Lymphocytes (%) (Auto) 15.8 %  


 


Monocytes (%) (Auto) 9.9 %  


 


Eosinophils (%) (Auto) 0.3 %  


 


Basophils (%) (Auto) 0.1 %  


 


Neutrophils # (Auto)


  10.30 K/uL


(1.4-6.5) 


 


 


Lymphocytes # (Auto)


  2.22 K/uL


(1.2-3.4) 


 


 


Monocytes # (Auto)


  1.39 K/uL


(0.11-0.59) 


 


 


Eosinophils # (Auto)


  0.04 K/uL


(0-0.5) 


 


 


Basophils # (Auto)


  0.01 K/uL


(0-0.2) 


 


 


RDW Standard Deviation


  47.3 fL


(36.4-46.3) 


 


 


RDW Coefficient of Variation


  14.4 %


(11.5-14.5) 


 


 


Immature Granulocyte % (Auto) 0.6 %  


 


Immature Granulocyte # (Auto)


  0.08 K/uL


(0.00-0.02) 


 


 


D-Dimer


  1150 ug/L FEU


(0-500) 


 


 


Anion Gap


  9.0 mmol/L


(3-11) 


 


 


Est Creatinine Clear Calc


Drug Dose 141.8 ml/min 


  


 


 


Estimated GFR (


American) > 150.0 


  


 


 


Estimated GFR (Non-


American 132.7 


  


 


 


BUN/Creatinine Ratio 11.7 (10-20)  


 


Calcium Level


  8.8 mg/dl


(8.5-10.1) 


 


 


Total Bilirubin


  0.3 mg/dl


(0.2-1) 


 


 


Direct Bilirubin


  < 0.1 mg/dl


(0-0.2) 


 


 


Aspartate Amino Transf


(AST/SGOT) 10 U/L (15-37) 


  


 


 


Alanine Aminotransferase


(ALT/SGPT) 16 U/L (12-78) 


  


 


 


Alkaline Phosphatase


  76 U/L


() 


 


 


Troponin I


  < 0.015 ng/ml


(0-0.045) 


 


 


Total Protein


  7.2 gm/dl


(6.4-8.2) 


 


 


Albumin


  2.7 gm/dl


(3.4-5.0) 


 


 


Urine Color  YELLOW 


 


Urine Appearance  TURBID (CLEAR) 


 


Urine pH  8.5 (4.5-7.5) 


 


Urine Specific Gravity


  


  1.013


(1.000-1.030)


 


Urine Protein  NEG (NEG) 


 


Urine Glucose (UA)  NEG (NEG) 


 


Urine Ketones  NEG (NEG) 


 


Urine Occult Blood  NEG (NEG) 


 


Urine Nitrite  NEG (NEG) 


 


Urine Bilirubin  NEG (NEG) 


 


Urine Urobilinogen  NEG (NEG) 


 


Urine Leukocyte Esterase  TRACE (NEG) 


 


Urine WBC (Auto)  1-5 /hpf (0-5) 


 


Urine RBC (Auto)  0-4 /hpf (0-4) 


 


Urine Hyaline Casts (Auto)  1-5 /lpf (0-5) 


 


Urine Epithelial Cells (Auto)  >30 /lpf (0-5) 


 


Urine Bacteria (Auto)  1+ (NEG) 











Medications Administered











 Medications


  (Trade)  Dose


 Ordered  Sig/Nely


 Route  Start Time


 Stop Time Status Last Admin


Dose Admin


 


 Sodium Chloride  1,000 ml @ 


 999 mls/hr  Q1H1M STAT


 IV  3/23/18 17:32


 3/23/18 18:32 DC 3/23/18 17:32


999 MLS/HR


 


 Sodium Chloride  1,000 ml @ 


 999 mls/hr  Q1H1M STAT


 IV  3/23/18 19:09


 3/23/18 20:09 DC 3/23/18 19:09


999 MLS/HR











ECG Per My Interpretation


Indication:  SOB/dyspnea


Rate (beats per minute):  90


Rhythm:  normal sinus


Findings:  no acute ischemic change, no ectopy, other (QTc of 450; nonspecific 

anterior / septal T wave inversion/flattening)





ED Course


1725: The patient was evaluated in room C8. A complete history and physical 

exam was performed.





1838: I checked on the patient and a bedside fetal ultrasound was performed 

which revels normal intrauterine pregnancy with anterior placenta. Head down. 

Fetal heart at 158. Positive fetal movement. Discussed D dimer with the 

patient. She would like to hold off until urine comes back and discuses with 

Dr. Brunner first. 





1908: After discussion with mother and patient, as well as brief discussion 

with Dr. Brunner, we will get a CT scan of chest to rule out PE.





1951: I checked on the patient and she is feeling well. She would like to go 

home. She states that she has an increasing runny nose. She denies fevers. She 

will follow up with her OBGYN.





2000: Reevaluated the patient. Discussed results and discharge instructions: 

She verbalized understanding and agreement. The patient is ready for discharge.





Medical Decision


22 yr old female with host of symptoms at 22 wks pregnant.  Lightheaded, pre-

syncopal with chest tightness.  She is tachy on arrival but also dehydrated.  A 

bit Hypertensive but after relaxing BP much improved and normalized.  She has 

elevated dimer.  Given her reported symptoms, her tachycardia on arrival, and 

an elevated dimer I felt CT PE indicated (did discuss with Dr Ferrer who agreed)

.  CT PE unremarkable fortunately.  Patient feeling much better.  Did have some 

runny nose developing but no signficaitn flu like symptoms, however with it 

still flu season, it being the weekend and being pregnancy discussed empiric 

Tamiflu if she develops fevers/body aches.  Patient and mother (RN) comfortable 

with this plan.  Discharged to home feeling well and in no distress.





Medication Reconcilliation


Current Medication List:  was personally reviewed by me





Blood Pressure Screening


Patient's blood pressure:  Elevated blood pressure


Blood pressure disposition:  Elevated BP felt to be situational





Impression





 Primary Impression:  


 Pre-syncope


 Additional Impressions:  


 Dehydration


 Elevated d-dimer


 Fatigue





Scribe Attestation


The scribe's documentation has been prepared under my direction and personally 

reviewed by me in its entirety. I confirm that the note above accurately 

reflects all work, treatment, procedures, and medical decision making performed 

by me.





Departure Information


Dispostion


Home / Self-Care





Prescriptions





Oseltamivir (Tamiflu) 75 Mg Cap


75 MG PO BID, #10 CAP


   Prov: James Son M.D.         3/23/18





Referrals


Bob Tejada M.D. (PCP)





Patient Instructions


My [facility]





Additional Instructions





Rest and keep well hydrated.


Avoid exertion over next 48 hours and eat a well balanced meal.


Return if worsening symptoms, pain, passing out or other concerns.


If you develop fevers, body aches, and/or cough you may wish to start anti-flu 

medication, Tamiflu, as you are pregnant, or discuss this as soon as possible 

with OB.





Problem Qualifiers

## 2018-05-10 ENCOUNTER — HOSPITAL ENCOUNTER (OUTPATIENT)
Dept: HOSPITAL 45 - C.LAB1850 | Age: 23
Discharge: HOME | End: 2018-05-10
Attending: OBSTETRICS & GYNECOLOGY
Payer: COMMERCIAL

## 2018-05-10 DIAGNOSIS — Z34.03: Primary | ICD-10-CM

## 2018-05-10 LAB
HCT VFR BLD CALC: 33.7 % (ref 37–47)
HGB BLD-MCNC: 11.6 G/DL (ref 12–16)